# Patient Record
Sex: MALE | Race: WHITE | Employment: OTHER | URBAN - METROPOLITAN AREA
[De-identification: names, ages, dates, MRNs, and addresses within clinical notes are randomized per-mention and may not be internally consistent; named-entity substitution may affect disease eponyms.]

---

## 2018-07-09 ENCOUNTER — APPOINTMENT (OUTPATIENT)
Dept: GENERAL RADIOLOGY | Age: 68
End: 2018-07-09
Attending: EMERGENCY MEDICINE
Payer: MEDICARE

## 2018-07-09 ENCOUNTER — HOSPITAL ENCOUNTER (EMERGENCY)
Age: 68
Discharge: HOME | End: 2018-07-09
Attending: EMERGENCY MEDICINE
Payer: MEDICARE

## 2018-07-09 VITALS
TEMPERATURE: 98.3 F | OXYGEN SATURATION: 93 % | SYSTOLIC BLOOD PRESSURE: 145 MMHG | HEART RATE: 70 BPM | BODY MASS INDEX: 29.67 KG/M2 | WEIGHT: 195.8 LBS | RESPIRATION RATE: 15 BRPM | DIASTOLIC BLOOD PRESSURE: 75 MMHG | HEIGHT: 68 IN

## 2018-07-09 DIAGNOSIS — R07.89 ATYPICAL CHEST PAIN: Primary | ICD-10-CM

## 2018-07-09 LAB
ALBUMIN SERPL-MCNC: 3.6 G/DL (ref 3.5–5)
ALBUMIN/GLOB SERPL: 1.1 {RATIO} (ref 1.1–2.2)
ALP SERPL-CCNC: 38 U/L (ref 45–117)
ALT SERPL-CCNC: 27 U/L (ref 12–78)
ANION GAP SERPL CALC-SCNC: 8 MMOL/L (ref 5–15)
AST SERPL-CCNC: 17 U/L (ref 15–37)
ATTENDING PHYSICIAN, CST07: NORMAL
BASOPHILS # BLD: 0 K/UL (ref 0–0.1)
BASOPHILS NFR BLD: 1 % (ref 0–1)
BILIRUB SERPL-MCNC: 0.5 MG/DL (ref 0.2–1)
BUN SERPL-MCNC: 14 MG/DL (ref 6–20)
BUN/CREAT SERPL: 18 (ref 12–20)
CALCIUM SERPL-MCNC: 8.4 MG/DL (ref 8.5–10.1)
CHLORIDE SERPL-SCNC: 106 MMOL/L (ref 97–108)
CO2 SERPL-SCNC: 28 MMOL/L (ref 21–32)
CREAT SERPL-MCNC: 0.78 MG/DL (ref 0.7–1.3)
D DIMER PPP FEU-MCNC: 0.23 MG/L FEU (ref 0–0.65)
DIAGNOSIS, 93000: NORMAL
DIFFERENTIAL METHOD BLD: NORMAL
DUKE TM SCORE RESULT, CST14: NORMAL
DUKE TREADMILL SCORE, CST13: NORMAL
ECG INTERP BEFORE EX, CST11: NORMAL
ECG INTERP DURING EX, CST12: NORMAL
EOSINOPHIL # BLD: 0.1 K/UL (ref 0–0.4)
EOSINOPHIL NFR BLD: 1 % (ref 0–7)
ERYTHROCYTE [DISTWIDTH] IN BLOOD BY AUTOMATED COUNT: 13.2 % (ref 11.5–14.5)
FUNCTIONAL CAPACITY, CST17: NORMAL
GLOBULIN SER CALC-MCNC: 3.2 G/DL (ref 2–4)
GLUCOSE SERPL-MCNC: 99 MG/DL (ref 65–100)
HCT VFR BLD AUTO: 44.6 % (ref 36.6–50.3)
HGB BLD-MCNC: 14.4 G/DL (ref 12.1–17)
IMM GRANULOCYTES # BLD: 0 K/UL (ref 0–0.04)
IMM GRANULOCYTES NFR BLD AUTO: 0 % (ref 0–0.5)
KNOWN CARDIAC CONDITION, CST08: NORMAL
LYMPHOCYTES # BLD: 1.9 K/UL (ref 0.8–3.5)
LYMPHOCYTES NFR BLD: 24 % (ref 12–49)
MAX. DIASTOLIC BP, CST04: 71 MMHG
MAX. HEART RATE, CST05: 144 BPM
MAX. SYSTOLIC BP, CST03: 173 MMHG
MCH RBC QN AUTO: 29.4 PG (ref 26–34)
MCHC RBC AUTO-ENTMCNC: 32.3 G/DL (ref 30–36.5)
MCV RBC AUTO: 91.2 FL (ref 80–99)
MONOCYTES # BLD: 0.6 K/UL (ref 0–1)
MONOCYTES NFR BLD: 8 % (ref 5–13)
NEUTS SEG # BLD: 5.3 K/UL (ref 1.8–8)
NEUTS SEG NFR BLD: 66 % (ref 32–75)
NRBC # BLD: 0 K/UL (ref 0–0.01)
NRBC BLD-RTO: 0 PER 100 WBC
OVERALL BP RESPONSE TO EXERCISE, CST16: NORMAL
OVERALL HR RESPONSE TO EXERCISE, CST15: NORMAL
PEAK EX METS, CST10: 7 METS
PLATELET # BLD AUTO: 236 K/UL (ref 150–400)
PMV BLD AUTO: 10.7 FL (ref 8.9–12.9)
POTASSIUM SERPL-SCNC: 4.1 MMOL/L (ref 3.5–5.1)
PROT SERPL-MCNC: 6.8 G/DL (ref 6.4–8.2)
PROTOCOL NAME, CST01: NORMAL
RBC # BLD AUTO: 4.89 M/UL (ref 4.1–5.7)
REASON FOR TERMINATION: 67 BPM
SODIUM SERPL-SCNC: 142 MMOL/L (ref 136–145)
TEST INDICATION, CST09: NORMAL
TIME IN EXERCISE PHASE, CST02: NORMAL
TROPONIN I SERPL-MCNC: <0.05 NG/ML
WBC # BLD AUTO: 8 K/UL (ref 4.1–11.1)

## 2018-07-09 PROCEDURE — 96374 THER/PROPH/DIAG INJ IV PUSH: CPT

## 2018-07-09 PROCEDURE — 84484 ASSAY OF TROPONIN QUANT: CPT | Performed by: EMERGENCY MEDICINE

## 2018-07-09 PROCEDURE — 99285 EMERGENCY DEPT VISIT HI MDM: CPT

## 2018-07-09 PROCEDURE — 85025 COMPLETE CBC W/AUTO DIFF WBC: CPT | Performed by: EMERGENCY MEDICINE

## 2018-07-09 PROCEDURE — 93351 STRESS TTE COMPLETE: CPT

## 2018-07-09 PROCEDURE — 96361 HYDRATE IV INFUSION ADD-ON: CPT

## 2018-07-09 PROCEDURE — 85379 FIBRIN DEGRADATION QUANT: CPT | Performed by: EMERGENCY MEDICINE

## 2018-07-09 PROCEDURE — 74011250636 HC RX REV CODE- 250/636: Performed by: EMERGENCY MEDICINE

## 2018-07-09 PROCEDURE — 36415 COLL VENOUS BLD VENIPUNCTURE: CPT | Performed by: EMERGENCY MEDICINE

## 2018-07-09 PROCEDURE — 93005 ELECTROCARDIOGRAM TRACING: CPT

## 2018-07-09 PROCEDURE — 71045 X-RAY EXAM CHEST 1 VIEW: CPT

## 2018-07-09 PROCEDURE — 80053 COMPREHEN METABOLIC PANEL: CPT | Performed by: EMERGENCY MEDICINE

## 2018-07-09 PROCEDURE — 74011250637 HC RX REV CODE- 250/637: Performed by: EMERGENCY MEDICINE

## 2018-07-09 RX ORDER — GUAIFENESIN 100 MG/5ML
81 LIQUID (ML) ORAL DAILY
COMMUNITY

## 2018-07-09 RX ORDER — GUAIFENESIN 100 MG/5ML
324 LIQUID (ML) ORAL
Status: COMPLETED | OUTPATIENT
Start: 2018-07-09 | End: 2018-07-09

## 2018-07-09 RX ORDER — AMLODIPINE BESYLATE 10 MG/1
10 TABLET ORAL DAILY
COMMUNITY

## 2018-07-09 RX ORDER — LOVASTATIN 40 MG/1
40 TABLET ORAL
COMMUNITY

## 2018-07-09 RX ORDER — NITROGLYCERIN 0.4 MG/1
0.4 TABLET SUBLINGUAL
Status: DISCONTINUED | OUTPATIENT
Start: 2018-07-09 | End: 2018-07-09 | Stop reason: HOSPADM

## 2018-07-09 RX ORDER — ONDANSETRON 2 MG/ML
8 INJECTION INTRAMUSCULAR; INTRAVENOUS
Status: COMPLETED | OUTPATIENT
Start: 2018-07-09 | End: 2018-07-09

## 2018-07-09 RX ORDER — ACETAMINOPHEN 325 MG/1
650 TABLET ORAL
Status: COMPLETED | OUTPATIENT
Start: 2018-07-09 | End: 2018-07-09

## 2018-07-09 RX ADMIN — SODIUM CHLORIDE 1000 ML: 900 INJECTION, SOLUTION INTRAVENOUS at 06:35

## 2018-07-09 RX ADMIN — ONDANSETRON 8 MG: 2 INJECTION INTRAMUSCULAR; INTRAVENOUS at 06:47

## 2018-07-09 RX ADMIN — NITROGLYCERIN 0.4 MG: 0.4 TABLET SUBLINGUAL at 06:07

## 2018-07-09 RX ADMIN — ASPIRIN 81 MG 324 MG: 81 TABLET ORAL at 06:07

## 2018-07-09 RX ADMIN — ACETAMINOPHEN 650 MG: 325 TABLET ORAL at 06:48

## 2018-07-09 NOTE — ED NOTES
Bedside and Verbal shift change report given to Tuan Holt (oncoming nurse) by Carol Delgado RN (offgoing nurse). Report included the following information SBAR, ED Summary, MAR and Recent Results.

## 2018-07-09 NOTE — ED PROVIDER NOTES
Patient is a 75 y.o. male presenting with chest pain. Chest Pain (Angina)    The problem has been gradually worsening. Associated symptoms include dizziness and nausea. Pertinent negatives include no abdominal pain, no cough, no fever, no numbness, no shortness of breath, no vomiting and no weakness. 75year old male with history of htn, chol, anxiety, prostate cancer, presents with intermittent chest pain since Saturday night. Woke him from sleep on Saturday night- mid sternal discomfort with associated nausea and dizziness, not shortness of breath or diaphoresis. Has started working out again this past week and hadn't had any pain with exertion. Pain went away in the morning and he felt way by evening. Then again last night woke him around midnight. Pain does go up a little bit into his neck. He has HTN, Chol , and a family history (grandfather) with heart disease. No DM. Quit smoking in 1980. No leg pain or swelling. Did drive from [place] in early June. Lives in [place] most of the year and comes to [place] for the summer because their children/grandchildren live here. Currently 6/10 pain. Past Medical History:   Diagnosis Date    Anxiety     Hypercholesteremia     Hypertension     Prostate CA ([facility] [address]        No past surgical history on file. No family history on file. Social History     Social History    Marital status:      Spouse name: N/A    Number of children: N/A    Years of education: N/A     Occupational History    Not on file. Social History Main Topics    Smoking status: Not on file    Smokeless tobacco: Not on file    Alcohol use Not on file    Drug use: Not on file    Sexual activity: Not on file     Other Topics Concern    Not on file     Social History Narrative    No narrative on file         ALLERGIES: Review of patient's allergies indicates no known allergies. Review of Systems   Constitutional: Negative for fever.    HENT: Negative for congestion. Eyes: Negative for visual disturbance. Respiratory: Positive for chest tightness. Negative for cough and shortness of breath. Cardiovascular: Positive for chest pain. Negative for leg swelling. Gastrointestinal: Positive for nausea. Negative for abdominal pain and vomiting. Endocrine: Negative for polyuria. Genitourinary: Negative for dysuria. Musculoskeletal: Negative for gait problem. Skin: Negative for rash. Neurological: Positive for dizziness. Negative for weakness and numbness. Psychiatric/Behavioral: Negative for dysphoric mood. Vitals:    07/09/18 0529 07/09/18 0531   BP:  158/88   Pulse:  60   Resp:  14   Temp:  97.9 °F (36.6 °C)   SpO2:  96%   Weight: 88.8 kg (195 lb 12.8 oz)    Height:  5' 8\" (1.727 m)            Physical Exam   Constitutional: He is oriented to person, place, and time. He appears well-developed and well-nourished. No distress. HENT:   Head: Normocephalic and atraumatic. Mouth/Throat: Oropharynx is clear and moist. No oropharyngeal exudate. Eyes: Conjunctivae and EOM are normal. Pupils are equal, round, and reactive to light. Right eye exhibits no discharge. Left eye exhibits no discharge. No scleral icterus. Neck: Normal range of motion. Neck supple. No JVD present. Cardiovascular: Normal rate, regular rhythm, normal heart sounds and intact distal pulses. Exam reveals no gallop and no friction rub. No murmur heard. Pulmonary/Chest: Effort normal and breath sounds normal. No stridor. No respiratory distress. He has no wheezes. He has no rales. He exhibits no tenderness. Abdominal: Soft. Bowel sounds are normal. He exhibits no distension and no mass. There is no tenderness. There is no rebound and no guarding. Musculoskeletal: Normal range of motion. He exhibits no edema or tenderness. Neurological: He is alert and oriented to person, place, and time. He has normal reflexes. No cranial nerve deficit.  He exhibits normal muscle tone. Coordination normal.   Skin: Skin is warm and dry. No rash noted. No erythema. Psychiatric: He has a normal mood and affect. His behavior is normal. Judgment and thought content normal.        MDM      ED Course       Procedures         ED EKG interpretation:  Rhythm: normal sinus rhythm; and regular . Rate (approx.): 62; Axis: left axis deviation; P wave: normal; QRS interval: normal ; ST/T wave: non-specific changes; . This EKG was interpreted by Hazel Hron MD,ED Provider. Patient dropped his blood pressure and pulse with the Nitro- fluid bolus ordered. CXR clear. Initial troponin neg. CBC/ddimer still pending. Work up reassuring. No change on serial EKGs. Neg tropon with >5 hours of pain. Pain resolve with zofran/tylenol. ? Abdirizak Garcia? GIven risk factors will order stress echo. If neg, d/c with PPI's and follow up with gi/pcp.

## 2018-07-09 NOTE — DISCHARGE INSTRUCTIONS
Chest Pain: Care Instructions  Your Care Instructions    There are many things that can cause chest pain. Some are not serious and will get better on their own in a few days. But some kinds of chest pain need more testing and treatment. Your doctor may have recommended a follow-up visit in the next 8 to 12 hours. If you are not getting better, you may need more tests or treatment. Even though your doctor has released you, you still need to watch for any problems. The doctor carefully checked you, but sometimes problems can develop later. If you have new symptoms or if your symptoms do not get better, get medical care right away. If you have worse or different chest pain or pressure that lasts more than 5 minutes or you passed out (lost consciousness), call 911 or seek other emergency help right away. A medical visit is only one step in your treatment. Even if you feel better, you still need to do what your doctor recommends, such as going to all suggested follow-up appointments and taking medicines exactly as directed. This will help you recover and help prevent future problems. How can you care for yourself at home? · Rest until you feel better. · Take your medicine exactly as prescribed. Call your doctor if you think you are having a problem with your medicine. · Do not drive after taking a prescription pain medicine. When should you call for help? Call 911 if:  ? · You passed out (lost consciousness). ? · You have severe difficulty breathing. ? · You have symptoms of a heart attack. These may include:  ¨ Chest pain or pressure, or a strange feeling in your chest.  ¨ Sweating. ¨ Shortness of breath. ¨ Nausea or vomiting. ¨ Pain, pressure, or a strange feeling in your back, neck, jaw, or upper belly or in one or both shoulders or arms. ¨ Lightheadedness or sudden weakness. ¨ A fast or irregular heartbeat.   After you call 911, the  may tell you to chew 1 adult-strength or 2 to 4 low-dose aspirin. Wait for an ambulance. Do not try to drive yourself. ?Call your doctor today if:  ? · You have any trouble breathing. ? · Your chest pain gets worse. ? · You are dizzy or lightheaded, or you feel like you may faint. ? · You are not getting better as expected. ? · You are having new or different chest pain. Where can you learn more? Go to http://abigail-ruthie.info/. Enter A120 in the search box to learn more about \"Chest Pain: Care Instructions. \"  Current as of: March 20, 2017  Content Version: 11.4  © 1001-5156 [employer]. Care instructions adapted under license by [employer] (which disclaims liability or warranty for this information). If you have questions about a medical condition or this instruction, always ask your healthcare professional. [address] any warranty or liability for your use of this information.

## 2018-07-09 NOTE — ED TRIAGE NOTES
Triage note: Pt states he started to have dizziness and midsternal chest discomfort that is intermittent since yesterday morning. Denies N/V/dyspnea/numbness/tingling.

## 2018-07-09 NOTE — ED NOTES
7:50 AMChange of shift. Care of patient taken over from Dr Batsheva Neves; H&P reviewed. Awaiting labs/imaging/consultant. Handoff complete.

## 2018-07-09 NOTE — ED NOTES
Chest X-ray: Impression: Normal chest.    9:18 AM  Lab work, chest x-ray unremarkable. Negative troponin, negative D-Dimer. Discussed available lab and imaging results with patient. He verbalizes understanding and agrees with care plan. Will discharge patient home with specific return precautions; no prescriptions; advised f/u with cardiology, PCP, [facility] ED as needed. Patient's results have been reviewed with them. Patient and/or family have verbally conveyed their understanding and agreement of the patient's signs, symptoms, diagnosis, treatment and prognosis and additionally agree to follow up as recommended or return to the Emergency Room should their condition change prior to follow-up. Discharge instructions have also been provided to the patient with some educational information regarding their diagnosis as well a list of reasons why they would want to return to the ER prior to their follow-up appointment should their condition change.

## 2018-07-10 LAB
ATRIAL RATE: 62 BPM
ATRIAL RATE: 65 BPM
CALCULATED P AXIS, ECG09: 48 DEGREES
CALCULATED P AXIS, ECG09: 61 DEGREES
CALCULATED R AXIS, ECG10: -22 DEGREES
CALCULATED R AXIS, ECG10: -23 DEGREES
CALCULATED T AXIS, ECG11: -5 DEGREES
CALCULATED T AXIS, ECG11: 18 DEGREES
DIAGNOSIS, 93000: NORMAL
DIAGNOSIS, 93000: NORMAL
P-R INTERVAL, ECG05: 162 MS
P-R INTERVAL, ECG05: 170 MS
Q-T INTERVAL, ECG07: 402 MS
Q-T INTERVAL, ECG07: 422 MS
QRS DURATION, ECG06: 92 MS
QRS DURATION, ECG06: 94 MS
QTC CALCULATION (BEZET), ECG08: 408 MS
QTC CALCULATION (BEZET), ECG08: 438 MS
VENTRICULAR RATE, ECG03: 62 BPM
VENTRICULAR RATE, ECG03: 65 BPM

## 2022-06-04 ENCOUNTER — TELEPHONE ENCOUNTER (OUTPATIENT)
Dept: URBAN - METROPOLITAN AREA CLINIC 68 | Facility: CLINIC | Age: 72
End: 2022-06-04

## 2022-06-05 ENCOUNTER — TELEPHONE ENCOUNTER (OUTPATIENT)
Dept: URBAN - METROPOLITAN AREA CLINIC 68 | Facility: CLINIC | Age: 72
End: 2022-06-05

## 2022-06-05 RX ORDER — LOVASTATIN 40 MG/1
LOVASTATIN( 40MG ORAL  DAILY ) ACTIVE -HX ENTRY TABLET ORAL DAILY
Status: ACTIVE | COMMUNITY
Start: 2013-11-21

## 2022-06-05 RX ORDER — SILODOSIN 8 MG/1
RAPAFLO( 8MG ORAL  PRN ) ACTIVE -HX ENTRY CAPSULE ORAL PRN
Status: ACTIVE | COMMUNITY
Start: 2013-11-21

## 2022-06-05 RX ORDER — AMLODIPINE VALSARTAN AND HYDROCHLOROTHIAZIDE 10; 320; 25 MG/1; MG/1; MG/1
EXFORGE HCT( 10-320-25MG ORAL  DAILY ) ACTIVE -HX ENTRY TABLET, FILM COATED ORAL DAILY
Status: ACTIVE | COMMUNITY
Start: 2013-11-21

## 2022-06-25 ENCOUNTER — TELEPHONE ENCOUNTER (OUTPATIENT)
Age: 72
End: 2022-06-25

## 2022-06-25 RX ORDER — SODIUM SULFATE, POTASSIUM SULFATE, MAGNESIUM SULFATE 17.5; 3.13; 1.6 G/ML; G/ML; G/ML
SOLUTION, CONCENTRATE ORAL AS DIRECTED
Qty: 1 | Refills: 0 | OUTPATIENT
Start: 2013-11-21 | End: 2013-11-22

## 2022-06-26 ENCOUNTER — TELEPHONE ENCOUNTER (OUTPATIENT)
Age: 72
End: 2022-06-26

## 2022-06-26 RX ORDER — SILODOSIN 8 MG/1
RAPAFLO( 8MG ORAL  PRN ) ACTIVE -HX ENTRY CAPSULE ORAL PRN
Status: ACTIVE | COMMUNITY
Start: 2013-11-21

## 2022-06-26 RX ORDER — LOVASTATIN 40 MG/1
LOVASTATIN( 40MG ORAL  DAILY ) ACTIVE -HX ENTRY TABLET ORAL DAILY
Status: ACTIVE | COMMUNITY
Start: 2013-11-21

## 2022-06-26 RX ORDER — AMLODIPINE VALSARTAN AND HYDROCHLOROTHIAZIDE 10; 320; 25 MG/1; MG/1; MG/1
EXFORGE HCT( 10-320-25MG ORAL  DAILY ) ACTIVE -HX ENTRY TABLET, FILM COATED ORAL DAILY
Status: ACTIVE | COMMUNITY
Start: 2013-11-21

## 2023-12-18 ENCOUNTER — OFFICE VISIT (OUTPATIENT)
Dept: URBAN - METROPOLITAN AREA CLINIC 68 | Facility: CLINIC | Age: 73
End: 2023-12-18

## 2025-05-11 ENCOUNTER — APPOINTMENT (OUTPATIENT)
Dept: CT IMAGING | Age: 75
End: 2025-05-11
Payer: MEDICARE

## 2025-05-11 ENCOUNTER — HOSPITAL ENCOUNTER (OUTPATIENT)
Age: 75
Setting detail: OBSERVATION
Discharge: HOME | End: 2025-05-12
Attending: EMERGENCY MEDICINE | Admitting: STUDENT IN AN ORGANIZED HEALTH CARE EDUCATION/TRAINING PROGRAM
Payer: MEDICARE

## 2025-05-11 DIAGNOSIS — G45.9 TIA (TRANSIENT ISCHEMIC ATTACK): Primary | ICD-10-CM

## 2025-05-11 DIAGNOSIS — I63.9 CEREBROVASCULAR ACCIDENT (CVA), UNSPECIFIED MECHANISM (HCC): ICD-10-CM

## 2025-05-11 PROBLEM — I10 HYPERTENSION: Status: ACTIVE | Noted: 2025-05-11

## 2025-05-11 PROBLEM — R73.9 HYPERGLYCEMIA: Status: ACTIVE | Noted: 2025-05-11

## 2025-05-11 PROBLEM — E78.5 HYPERLIPIDEMIA: Status: ACTIVE | Noted: 2025-05-11

## 2025-05-11 PROBLEM — M54.50 CHRONIC LOW BACK PAIN: Status: ACTIVE | Noted: 2025-05-11

## 2025-05-11 PROBLEM — G89.29 CHRONIC LOW BACK PAIN: Status: ACTIVE | Noted: 2025-05-11

## 2025-05-11 PROBLEM — Z96.652 HISTORY OF LEFT KNEE REPLACEMENT: Status: ACTIVE | Noted: 2025-05-11

## 2025-05-11 LAB
ALBUMIN SERPL-MCNC: 3.6 G/DL (ref 3.2–4.6)
ALBUMIN/GLOB SERPL: 1.2 (ref 1–1.9)
ALP SERPL-CCNC: 45 U/L (ref 40–129)
ALT SERPL-CCNC: 42 U/L (ref 8–55)
ANION GAP SERPL CALC-SCNC: 10 MMOL/L (ref 7–16)
AST SERPL-CCNC: 46 U/L (ref 15–37)
BASOPHILS # BLD: 0.03 K/UL (ref 0–0.2)
BASOPHILS NFR BLD: 0.4 % (ref 0–2)
BILIRUB SERPL-MCNC: 0.2 MG/DL (ref 0–1.2)
BUN SERPL-MCNC: 18 MG/DL (ref 8–23)
CALCIUM SERPL-MCNC: 9.8 MG/DL (ref 8.8–10.2)
CHLORIDE SERPL-SCNC: 106 MMOL/L (ref 98–107)
CO2 SERPL-SCNC: 26 MMOL/L (ref 20–29)
CREAT SERPL-MCNC: 0.81 MG/DL (ref 0.8–1.3)
DIFFERENTIAL METHOD BLD: ABNORMAL
EOSINOPHIL # BLD: 0.09 K/UL (ref 0–0.8)
EOSINOPHIL NFR BLD: 1.1 % (ref 0.5–7.8)
ERYTHROCYTE [DISTWIDTH] IN BLOOD BY AUTOMATED COUNT: 14.2 % (ref 11.9–14.6)
GLOBULIN SER CALC-MCNC: 3 G/DL (ref 2.3–3.5)
GLUCOSE SERPL-MCNC: 124 MG/DL (ref 70–99)
HCT VFR BLD AUTO: 41 % (ref 41.1–50.3)
HGB BLD-MCNC: 13.7 G/DL (ref 13.6–17.2)
IMM GRANULOCYTES # BLD AUTO: 0.01 K/UL (ref 0–0.5)
IMM GRANULOCYTES NFR BLD AUTO: 0.1 % (ref 0–5)
INR PPP: 0.9
LYMPHOCYTES # BLD: 2.77 K/UL (ref 0.5–4.6)
LYMPHOCYTES NFR BLD: 33.7 % (ref 13–44)
MAGNESIUM SERPL-MCNC: 2.2 MG/DL (ref 1.8–2.4)
MCH RBC QN AUTO: 29.7 PG (ref 26.1–32.9)
MCHC RBC AUTO-ENTMCNC: 33.4 G/DL (ref 31.4–35)
MCV RBC AUTO: 88.7 FL (ref 82–102)
MONOCYTES # BLD: 0.6 K/UL (ref 0.1–1.3)
MONOCYTES NFR BLD: 7.3 % (ref 4–12)
NEUTS SEG # BLD: 4.72 K/UL (ref 1.7–8.2)
NEUTS SEG NFR BLD: 57.4 % (ref 43–78)
NRBC # BLD: 0 K/UL (ref 0–0.2)
PLATELET # BLD AUTO: 235 K/UL (ref 150–450)
PMV BLD AUTO: 10.5 FL (ref 9.4–12.3)
POTASSIUM SERPL-SCNC: ABNORMAL MMOL/L (ref 3.5–5.1)
PROT SERPL-MCNC: 6.6 G/DL (ref 6.3–8.2)
PROTHROMBIN TIME: 12.5 SEC (ref 11.3–14.9)
RBC # BLD AUTO: 4.62 M/UL (ref 4.23–5.6)
SODIUM SERPL-SCNC: 142 MMOL/L (ref 136–145)
WBC # BLD AUTO: 8.2 K/UL (ref 4.3–11.1)

## 2025-05-11 PROCEDURE — 70450 CT HEAD/BRAIN W/O DYE: CPT

## 2025-05-11 PROCEDURE — 85610 PROTHROMBIN TIME: CPT

## 2025-05-11 PROCEDURE — 83735 ASSAY OF MAGNESIUM: CPT

## 2025-05-11 PROCEDURE — 2500000003 HC RX 250 WO HCPCS: Performed by: STUDENT IN AN ORGANIZED HEALTH CARE EDUCATION/TRAINING PROGRAM

## 2025-05-11 PROCEDURE — 6360000004 HC RX CONTRAST MEDICATION: Performed by: EMERGENCY MEDICINE

## 2025-05-11 PROCEDURE — G0378 HOSPITAL OBSERVATION PER HR: HCPCS

## 2025-05-11 PROCEDURE — 6370000000 HC RX 637 (ALT 250 FOR IP): Performed by: STUDENT IN AN ORGANIZED HEALTH CARE EDUCATION/TRAINING PROGRAM

## 2025-05-11 PROCEDURE — 70498 CT ANGIOGRAPHY NECK: CPT

## 2025-05-11 PROCEDURE — 99285 EMERGENCY DEPT VISIT HI MDM: CPT

## 2025-05-11 PROCEDURE — 80053 COMPREHEN METABOLIC PANEL: CPT

## 2025-05-11 PROCEDURE — 93005 ELECTROCARDIOGRAM TRACING: CPT | Performed by: EMERGENCY MEDICINE

## 2025-05-11 PROCEDURE — 85025 COMPLETE CBC W/AUTO DIFF WBC: CPT

## 2025-05-11 RX ORDER — LABETALOL HYDROCHLORIDE 5 MG/ML
10 INJECTION, SOLUTION INTRAVENOUS
Status: DISCONTINUED | OUTPATIENT
Start: 2025-05-11 | End: 2025-05-12 | Stop reason: HOSPADM

## 2025-05-11 RX ORDER — VALSARTAN 80 MG/1
40 TABLET ORAL DAILY
Status: DISCONTINUED | OUTPATIENT
Start: 2025-05-11 | End: 2025-05-12 | Stop reason: HOSPADM

## 2025-05-11 RX ORDER — ASPIRIN 300 MG/1
300 SUPPOSITORY RECTAL ONCE
Status: COMPLETED | OUTPATIENT
Start: 2025-05-11 | End: 2025-05-11

## 2025-05-11 RX ORDER — ROSUVASTATIN CALCIUM 20 MG/1
20 TABLET, COATED ORAL DAILY
COMMUNITY

## 2025-05-11 RX ORDER — ENOXAPARIN SODIUM 100 MG/ML
40 INJECTION SUBCUTANEOUS DAILY
Status: DISCONTINUED | OUTPATIENT
Start: 2025-05-12 | End: 2025-05-12 | Stop reason: HOSPADM

## 2025-05-11 RX ORDER — ONDANSETRON 2 MG/ML
4 INJECTION INTRAMUSCULAR; INTRAVENOUS EVERY 6 HOURS PRN
Status: DISCONTINUED | OUTPATIENT
Start: 2025-05-11 | End: 2025-05-12 | Stop reason: HOSPADM

## 2025-05-11 RX ORDER — SODIUM CHLORIDE 0.9 % (FLUSH) 0.9 %
5-40 SYRINGE (ML) INJECTION PRN
Status: DISCONTINUED | OUTPATIENT
Start: 2025-05-11 | End: 2025-05-12 | Stop reason: HOSPADM

## 2025-05-11 RX ORDER — HYDROCHLOROTHIAZIDE 12.5 MG/1
12.5 CAPSULE ORAL DAILY
COMMUNITY

## 2025-05-11 RX ORDER — ASPIRIN 81 MG/1
81 TABLET ORAL DAILY
COMMUNITY

## 2025-05-11 RX ORDER — ONDANSETRON 4 MG/1
4 TABLET, ORALLY DISINTEGRATING ORAL EVERY 8 HOURS PRN
Status: DISCONTINUED | OUTPATIENT
Start: 2025-05-11 | End: 2025-05-12 | Stop reason: HOSPADM

## 2025-05-11 RX ORDER — IOPAMIDOL 755 MG/ML
100 INJECTION, SOLUTION INTRAVASCULAR
Status: COMPLETED | OUTPATIENT
Start: 2025-05-11 | End: 2025-05-11

## 2025-05-11 RX ORDER — SODIUM CHLORIDE 9 MG/ML
INJECTION, SOLUTION INTRAVENOUS PRN
Status: DISCONTINUED | OUTPATIENT
Start: 2025-05-11 | End: 2025-05-12 | Stop reason: HOSPADM

## 2025-05-11 RX ORDER — POLYETHYLENE GLYCOL 3350 17 G/17G
17 POWDER, FOR SOLUTION ORAL DAILY PRN
Status: DISCONTINUED | OUTPATIENT
Start: 2025-05-11 | End: 2025-05-12 | Stop reason: HOSPADM

## 2025-05-11 RX ORDER — ROSUVASTATIN CALCIUM 10 MG/1
40 TABLET, COATED ORAL NIGHTLY
Status: DISCONTINUED | OUTPATIENT
Start: 2025-05-11 | End: 2025-05-12 | Stop reason: HOSPADM

## 2025-05-11 RX ORDER — ASPIRIN 325 MG
325 TABLET ORAL ONCE
Status: COMPLETED | OUTPATIENT
Start: 2025-05-11 | End: 2025-05-11

## 2025-05-11 RX ORDER — SODIUM CHLORIDE 0.9 % (FLUSH) 0.9 %
5-40 SYRINGE (ML) INJECTION EVERY 12 HOURS SCHEDULED
Status: DISCONTINUED | OUTPATIENT
Start: 2025-05-11 | End: 2025-05-12 | Stop reason: HOSPADM

## 2025-05-11 RX ORDER — AMLODIPINE BESYLATE 10 MG/1
10 TABLET ORAL DAILY
Status: DISCONTINUED | OUTPATIENT
Start: 2025-05-11 | End: 2025-05-12 | Stop reason: HOSPADM

## 2025-05-11 RX ORDER — ASPIRIN 81 MG/1
81 TABLET, CHEWABLE ORAL DAILY
Status: DISCONTINUED | OUTPATIENT
Start: 2025-05-12 | End: 2025-05-12 | Stop reason: HOSPADM

## 2025-05-11 RX ORDER — SEMAGLUTIDE 1.34 MG/ML
1 INJECTION, SOLUTION SUBCUTANEOUS
COMMUNITY

## 2025-05-11 RX ADMIN — VALSARTAN 40 MG: 40 TABLET, FILM COATED ORAL at 21:24

## 2025-05-11 RX ADMIN — ASPIRIN 325 MG: 325 TABLET, FILM COATED ORAL at 21:30

## 2025-05-11 RX ADMIN — SODIUM CHLORIDE, PRESERVATIVE FREE 10 ML: 5 INJECTION INTRAVENOUS at 21:53

## 2025-05-11 RX ADMIN — AMLODIPINE BESYLATE 10 MG: 10 TABLET ORAL at 21:23

## 2025-05-11 RX ADMIN — IOPAMIDOL 100 ML: 755 INJECTION, SOLUTION INTRAVENOUS at 18:20

## 2025-05-11 RX ADMIN — ROSUVASTATIN CALCIUM 40 MG: 10 TABLET, FILM COATED ORAL at 21:24

## 2025-05-11 ASSESSMENT — LIFESTYLE VARIABLES
HOW MANY STANDARD DRINKS CONTAINING ALCOHOL DO YOU HAVE ON A TYPICAL DAY: 1 OR 2
HOW OFTEN DO YOU HAVE A DRINK CONTAINING ALCOHOL: 4 OR MORE TIMES A WEEK

## 2025-05-11 ASSESSMENT — PAIN - FUNCTIONAL ASSESSMENT: PAIN_FUNCTIONAL_ASSESSMENT: NONE - DENIES PAIN

## 2025-05-11 NOTE — ED TRIAGE NOTES
Pt brought in by EMS from [facility] c/o sudden onset aphasia and unsteady gait that occurred at approx 1615 and lasted for approx 5 minutes. Wife states he did not have any facial droop, unilateral weakness, or numbness. Symptoms improved prior to EMS arrival. Pt reports that at around 1400 he began to feel somewhat \"fuzzy headed\" and felt like his \"brain was foggy\" but denies any other symptoms at that time. Denies previous hx of TIA or CVAs. Pt reports hx of brain fog d/t long covid.

## 2025-05-11 NOTE — H&P
Hospitalist History and Physical   Admit Date:  2025  5:10 PM   Name:  Choco Sarmiento   Age:  74 y.o.  Sex:  male  :  1950   MRN:  [id number]   Room:  [place] [address]    Presenting/Chief Complaint: Possible TIA     Reason(s) for Admission: TIA (transient ischemic attack) [G45.9]     History of Present Illness:   Choco Sarmiento is a 74 y.o. male with medical history of hypertension and hyperlipidemia who presented with an episode of expressive aphasia earlier today that lasted for about 5 minutes.  Patient is from [place] and in town visiting and was walking around a local festival when he was trying to talk with his wife and noticed he had difficulty speaking.  Patient's wife and patient both state he did not have any focal weakness in his arms or legs, no facial droop.  Patient denies chest pain, shortness of breath, palpitations, nausea, abdominal pain, vision changes associated.  Of note patient has been had a dull frontal headache over the past 2 days which is unusual for him.  He admits to not taking his home medications over the past 2 days due to traveling.  He states his blood pressure has been running in the 140s and 150s systolic even on his home regimen of amlodipine, valsartan, hydrochlorothiazide.    On arrival to the ED CMP and CBC largely unremarkable.  Head CT shows no acute intercranial abnormality.  CTA head and neck shows no acute abnormality.  EKG reviewed and shows sinus rhythm.  Brain MRI and echocardiogram ordered.  High-dose statin and full-strength aspirin given.    Patient evaluated at bedside.  Resting comfortably in bed with wife at bedside.  Patient states symptoms have completely resolved.  Systolic blood pressure around 200.  Neuroexam benign with no focal neurologic deficits noted, no pronator drift, no facial droop, no slurred speech or aphasia noted.      Assessment & Plan:     TIA:  - CT head: No acute abnormality  - CTA head and neck: No acute abnormality  - Brain

## 2025-05-11 NOTE — ED PROVIDER NOTES
Emergency Department Provider Note       [facility] EMERGENCY DEPT   PCP: Angelo Patricia RN   Age: 74 y.o.   Sex: male     DISPOSITION Decision To Discharge 05/11/2025 07:27:13 PM    ICD-10-CM    1. TIA (transient ischemic attack)  G45.9           Medical Decision Making     Patient has signs of a TIA.   no signs of a stroke and with a NIH of 0 he would not be a tPA candidate.  Stroke alert was not called based on his current clinical exam.  I did order CT head and a CTA of his head and neck along with labs and EKG.  Colton Hernandez MD 5:35 PM EDT 5/11/25      Patient's CT head and CTA have come back normal discussed case with family and with his daughter who is a neurologist in [place].  I will allow permissive hypertension he is 176/96 currently and I discussed case with hospitalist for admission for the rest of the TIA workup.  Colton Hernandez MD 7:25 PM EDT 5/11/25       1 or more acute illnesses that pose a threat to life or bodily function.   Discussion with external consultants.  Shared medical decision making was utilized in creating the patients health plan today.  I independently ordered and reviewed each unique test.       The patients assessment required an independent historian: wife and daughter.  The reason they were needed is important historical information not provided by the patient.  ED cardiac monitoring rhythm strip was ordered and interpreted:  sinus rhythm, no evidence of an arrhythmia  ST Segments:Normal ST segments - NO STEMI   Rate: 75 no A-fib or PVCs  I interpreted the CT Scan no intracranial bleed.  ED provider's independent EKG interpretation sinus rhythm rate of 77 no ischemia  The patient was admitted and I have discussed patient management with the admitting provider.  The management of this patient was discussed with an external consultant.            History     HISTORY OF PRESENT ILLNESS:    A 74-year-old male with a past medical history significant for hypertension

## 2025-05-12 ENCOUNTER — APPOINTMENT (OUTPATIENT)
Dept: NON INVASIVE DIAGNOSTICS | Age: 75
End: 2025-05-12
Attending: STUDENT IN AN ORGANIZED HEALTH CARE EDUCATION/TRAINING PROGRAM
Payer: MEDICARE

## 2025-05-12 ENCOUNTER — APPOINTMENT (OUTPATIENT)
Dept: MRI IMAGING | Age: 75
End: 2025-05-12
Payer: MEDICARE

## 2025-05-12 VITALS
HEART RATE: 68 BPM | TEMPERATURE: 98.1 F | SYSTOLIC BLOOD PRESSURE: 154 MMHG | HEIGHT: 68 IN | DIASTOLIC BLOOD PRESSURE: 84 MMHG | BODY MASS INDEX: 28.79 KG/M2 | RESPIRATION RATE: 17 BRPM | WEIGHT: 190 LBS | OXYGEN SATURATION: 97 %

## 2025-05-12 LAB
ANION GAP SERPL CALC-SCNC: 10 MMOL/L (ref 7–16)
BUN SERPL-MCNC: 12 MG/DL (ref 8–23)
CALCIUM SERPL-MCNC: 9 MG/DL (ref 8.8–10.2)
CHLORIDE SERPL-SCNC: 106 MMOL/L (ref 98–107)
CHOLEST SERPL-MCNC: 169 MG/DL (ref 0–200)
CO2 SERPL-SCNC: 28 MMOL/L (ref 20–29)
CREAT SERPL-MCNC: 0.66 MG/DL (ref 0.8–1.3)
ECHO AO ASC DIAM: 3 CM
ECHO AO ASCENDING AORTA INDEX: 1.5 CM/M2
ECHO AO ROOT DIAM: 3.6 CM
ECHO AO ROOT INDEX: 1.8 CM/M2
ECHO AV AREA PEAK VELOCITY: 1.2 CM2
ECHO AV AREA VTI: 1.3 CM2
ECHO AV AREA/BSA PEAK VELOCITY: 0.6 CM2/M2
ECHO AV AREA/BSA VTI: 0.7 CM2/M2
ECHO AV MEAN GRADIENT: 7 MMHG
ECHO AV MEAN VELOCITY: 1.3 M/S
ECHO AV PEAK GRADIENT: 12 MMHG
ECHO AV PEAK GRADIENT: 12 MMHG
ECHO AV PEAK VELOCITY: 1.7 M/S
ECHO AV VELOCITY RATIO: 0.41
ECHO AV VTI: 36.3 CM
ECHO BSA: 2.03 M2
ECHO EST RA PRESSURE: 3 MMHG
ECHO IVC PROX: 1.3 CM
ECHO LA AREA 2C: 14.9 CM2
ECHO LA AREA 4C: 14.5 CM2
ECHO LA MAJOR AXIS: 4.6 CM
ECHO LA MINOR AXIS: 4.9 CM
ECHO LA VOL BP: 37 ML (ref 18–58)
ECHO LA VOL MOD A2C: 37 ML (ref 18–58)
ECHO LA VOL MOD A4C: 36 ML (ref 18–58)
ECHO LA VOL/BSA BIPLANE: 19 ML/M2 (ref 16–34)
ECHO LA VOLUME INDEX MOD A2C: 19 ML/M2 (ref 16–34)
ECHO LA VOLUME INDEX MOD A4C: 18 ML/M2 (ref 16–34)
ECHO LV E' LATERAL VELOCITY: 5.11 CM/S
ECHO LV E' SEPTAL VELOCITY: 5.77 CM/S
ECHO LV EDV A2C: 137 ML
ECHO LV EDV A4C: 148 ML
ECHO LV EDV INDEX A4C: 74 ML/M2
ECHO LV EDV NDEX A2C: 69 ML/M2
ECHO LV EF PHYSICIAN: 65 %
ECHO LV EJECTION FRACTION A2C: 68 %
ECHO LV EJECTION FRACTION A4C: 68 %
ECHO LV EJECTION FRACTION BIPLANE: 68 % (ref 55–100)
ECHO LV ESV A2C: 45 ML
ECHO LV ESV A4C: 48 ML
ECHO LV ESV INDEX A2C: 23 ML/M2
ECHO LV ESV INDEX A4C: 24 ML/M2
ECHO LV FRACTIONAL SHORTENING: 41 % (ref 28–44)
ECHO LV INTERNAL DIMENSION DIASTOLE INDEX: 2.3 CM/M2
ECHO LV INTERNAL DIMENSION DIASTOLIC: 4.6 CM (ref 4.2–5.9)
ECHO LV INTERNAL DIMENSION SYSTOLIC INDEX: 1.35 CM/M2
ECHO LV INTERNAL DIMENSION SYSTOLIC: 2.7 CM
ECHO LV IVSD: 1 CM (ref 0.6–1)
ECHO LV MASS 2D: 158.8 G (ref 88–224)
ECHO LV MASS INDEX 2D: 79.4 G/M2 (ref 49–115)
ECHO LV POSTERIOR WALL DIASTOLIC: 1 CM (ref 0.6–1)
ECHO LV RELATIVE WALL THICKNESS RATIO: 0.43
ECHO LVOT AREA: 3.1 CM2
ECHO LVOT AV VTI INDEX: 0.42
ECHO LVOT DIAM: 2 CM
ECHO LVOT MEAN GRADIENT: 1 MMHG
ECHO LVOT PEAK GRADIENT: 2 MMHG
ECHO LVOT PEAK VELOCITY: 0.7 M/S
ECHO LVOT STROKE VOLUME INDEX: 24 ML/M2
ECHO LVOT SV: 48 ML
ECHO LVOT VTI: 15.3 CM
ECHO MV A VELOCITY: 0.91 M/S
ECHO MV AREA VTI: 2.2 CM2
ECHO MV E DECELERATION TIME (DT): 199 MS
ECHO MV E VELOCITY: 0.57 M/S
ECHO MV E/A RATIO: 0.63
ECHO MV E/E' LATERAL: 11.15
ECHO MV E/E' RATIO (AVERAGED): 10.52
ECHO MV E/E' SEPTAL: 9.88
ECHO MV LVOT VTI INDEX: 1.46
ECHO MV MAX VELOCITY: 1 M/S
ECHO MV MEAN GRADIENT: 2 MMHG
ECHO MV MEAN VELOCITY: 0.6 M/S
ECHO MV PEAK GRADIENT: 4 MMHG
ECHO MV VTI: 22.3 CM
ECHO PULMONARY ARTERY END DIASTOLIC PRESSURE: 3 MMHG
ECHO PV ACCELERATION TIME (AT): 92 MS
ECHO PV MAX VELOCITY: 0.9 M/S
ECHO PV PEAK GRADIENT: 3 MMHG
ECHO PV REGURGITANT END DIASTOLIC MAX VELOCITY: 0.9 M/S
ECHO RIGHT VENTRICULAR SYSTOLIC PRESSURE (RVSP): 16 MMHG
ECHO RV BASAL DIMENSION: 3.3 CM
ECHO RV LONGITUDINAL DIMENSION: 6.6 CM
ECHO RV MID DIMENSION: 3.1 CM
ECHO RV TAPSE: 2.1 CM (ref 1.7–?)
ECHO TV REGURGITANT MAX VELOCITY: 1.8 M/S
ECHO TV REGURGITANT PEAK GRADIENT: 13 MMHG
EKG ATRIAL RATE: 76 BPM
EKG DIAGNOSIS: NORMAL
EKG P AXIS: 59 DEGREES
EKG P-R INTERVAL: 171 MS
EKG Q-T INTERVAL: 396 MS
EKG QRS DURATION: 99 MS
EKG QTC CALCULATION (BAZETT): 449 MS
EKG R AXIS: -50 DEGREES
EKG T AXIS: 19 DEGREES
EKG VENTRICULAR RATE: 77 BPM
ERYTHROCYTE [DISTWIDTH] IN BLOOD BY AUTOMATED COUNT: 14.2 % (ref 11.9–14.6)
EST. AVERAGE GLUCOSE BLD GHB EST-MCNC: 131 MG/DL
GLUCOSE BLD STRIP.AUTO-MCNC: 110 MG/DL (ref 65–100)
GLUCOSE BLD STRIP.AUTO-MCNC: 114 MG/DL (ref 65–100)
GLUCOSE BLD STRIP.AUTO-MCNC: 92 MG/DL (ref 65–100)
GLUCOSE SERPL-MCNC: 100 MG/DL (ref 70–99)
HBA1C MFR BLD: 6.2 % (ref 0–5.6)
HCT VFR BLD AUTO: 40.6 % (ref 41.1–50.3)
HDLC SERPL-MCNC: 72 MG/DL (ref 40–60)
HDLC SERPL: 2.3 (ref 0–5)
HGB BLD-MCNC: 13.6 G/DL (ref 13.6–17.2)
LDLC SERPL CALC-MCNC: 81 MG/DL (ref 0–100)
MCH RBC QN AUTO: 29.8 PG (ref 26.1–32.9)
MCHC RBC AUTO-ENTMCNC: 33.5 G/DL (ref 31.4–35)
MCV RBC AUTO: 88.8 FL (ref 82–102)
NRBC # BLD: 0 K/UL (ref 0–0.2)
PLATELET # BLD AUTO: 175 K/UL (ref 150–450)
PMV BLD AUTO: 9.9 FL (ref 9.4–12.3)
POTASSIUM SERPL-SCNC: 4 MMOL/L (ref 3.5–5.1)
RBC # BLD AUTO: 4.57 M/UL (ref 4.23–5.6)
SERVICE CMNT-IMP: ABNORMAL
SERVICE CMNT-IMP: ABNORMAL
SERVICE CMNT-IMP: NORMAL
SODIUM SERPL-SCNC: 144 MMOL/L (ref 136–145)
TRIGL SERPL-MCNC: 80 MG/DL (ref 0–150)
VLDLC SERPL CALC-MCNC: 16 MG/DL (ref 6–23)
WBC # BLD AUTO: 8.6 K/UL (ref 4.3–11.1)

## 2025-05-12 PROCEDURE — 82962 GLUCOSE BLOOD TEST: CPT

## 2025-05-12 PROCEDURE — 92610 EVALUATE SWALLOWING FUNCTION: CPT

## 2025-05-12 PROCEDURE — 83036 HEMOGLOBIN GLYCOSYLATED A1C: CPT

## 2025-05-12 PROCEDURE — 96372 THER/PROPH/DIAG INJ SC/IM: CPT

## 2025-05-12 PROCEDURE — G0378 HOSPITAL OBSERVATION PER HR: HCPCS

## 2025-05-12 PROCEDURE — 70551 MRI BRAIN STEM W/O DYE: CPT

## 2025-05-12 PROCEDURE — 93306 TTE W/DOPPLER COMPLETE: CPT

## 2025-05-12 PROCEDURE — 36415 COLL VENOUS BLD VENIPUNCTURE: CPT

## 2025-05-12 PROCEDURE — 80061 LIPID PANEL: CPT

## 2025-05-12 PROCEDURE — 6370000000 HC RX 637 (ALT 250 FOR IP): Performed by: STUDENT IN AN ORGANIZED HEALTH CARE EDUCATION/TRAINING PROGRAM

## 2025-05-12 PROCEDURE — 6370000000 HC RX 637 (ALT 250 FOR IP): Performed by: NURSE PRACTITIONER

## 2025-05-12 PROCEDURE — 6360000002 HC RX W HCPCS: Performed by: STUDENT IN AN ORGANIZED HEALTH CARE EDUCATION/TRAINING PROGRAM

## 2025-05-12 PROCEDURE — 97535 SELF CARE MNGMENT TRAINING: CPT

## 2025-05-12 PROCEDURE — 85027 COMPLETE CBC AUTOMATED: CPT

## 2025-05-12 PROCEDURE — 99222 1ST HOSP IP/OBS MODERATE 55: CPT | Performed by: NURSE PRACTITIONER

## 2025-05-12 PROCEDURE — 93010 ELECTROCARDIOGRAM REPORT: CPT | Performed by: INTERNAL MEDICINE

## 2025-05-12 PROCEDURE — 93306 TTE W/DOPPLER COMPLETE: CPT | Performed by: INTERNAL MEDICINE

## 2025-05-12 PROCEDURE — 2500000003 HC RX 250 WO HCPCS: Performed by: STUDENT IN AN ORGANIZED HEALTH CARE EDUCATION/TRAINING PROGRAM

## 2025-05-12 PROCEDURE — 97161 PT EVAL LOW COMPLEX 20 MIN: CPT

## 2025-05-12 PROCEDURE — 80048 BASIC METABOLIC PNL TOTAL CA: CPT

## 2025-05-12 PROCEDURE — 97165 OT EVAL LOW COMPLEX 30 MIN: CPT

## 2025-05-12 RX ORDER — CLOPIDOGREL BISULFATE 75 MG/1
75 TABLET ORAL DAILY
Status: DISCONTINUED | OUTPATIENT
Start: 2025-05-13 | End: 2025-05-12 | Stop reason: HOSPADM

## 2025-05-12 RX ORDER — AMLODIPINE AND VALSARTAN 10; 320 MG/1; MG/1
1 TABLET ORAL DAILY
COMMUNITY

## 2025-05-12 RX ORDER — CLOPIDOGREL BISULFATE 75 MG/1
300 TABLET ORAL ONCE
Status: COMPLETED | OUTPATIENT
Start: 2025-05-12 | End: 2025-05-12

## 2025-05-12 RX ORDER — CLOPIDOGREL BISULFATE 75 MG/1
75 TABLET ORAL DAILY
Qty: 21 TABLET | Refills: 0 | Status: SHIPPED | OUTPATIENT
Start: 2025-05-13 | End: 2025-06-03

## 2025-05-12 RX ADMIN — SODIUM CHLORIDE, PRESERVATIVE FREE 5 ML: 5 INJECTION INTRAVENOUS at 09:04

## 2025-05-12 RX ADMIN — VALSARTAN 40 MG: 40 TABLET, FILM COATED ORAL at 09:01

## 2025-05-12 RX ADMIN — AMLODIPINE BESYLATE 10 MG: 10 TABLET ORAL at 09:01

## 2025-05-12 RX ADMIN — ENOXAPARIN SODIUM 40 MG: 100 INJECTION SUBCUTANEOUS at 09:01

## 2025-05-12 RX ADMIN — CLOPIDOGREL BISULFATE 300 MG: 75 TABLET, FILM COATED ORAL at 12:10

## 2025-05-12 RX ADMIN — ASPIRIN 81 MG: 81 TABLET, CHEWABLE ORAL at 09:01

## 2025-05-12 NOTE — DISCHARGE SUMMARY
Hospitalist Discharge Summary   Admit Date:  2025  5:10 PM   DC Note date: 2025  Name:  Choco Sarmiento   Age:  74 y.o.  Sex:  male  :  1950   MRN:  [id number]   Room:  [facility]  PCP:  Angelo Patricia RN    Presenting Complaint: Possible TIA     Initial Admission Diagnosis: TIA (transient ischemic attack) [G45.9]  Cerebrovascular accident (CVA), unspecified mechanism (HCC) [I63.9]     Problem List for this Hospitalization (present on admission):    Principal Problem:    TIA (transient ischemic attack)  Active Problems:    Hypertension    Hyperlipidemia    Hyperglycemia    History of left knee replacement    Chronic low back pain  Resolved Problems:    * No resolved hospital problems. *      Hospital Course:  Choco Sarmiento is a 74 y.o. male with medical history of hypertension and hyperlipidemia who presented with an episode of expressive aphasia earlier today that lasted for about 5 minutes.  Patient is visiting from [place] and was walking around a local festival when he was trying to talk with his wife and noticed he had difficulty speaking.  Patient's wife and patient both state he did not have any focal weakness in his arms or legs, no facial droop.  Patient denies chest pain, shortness of breath, palpitations, nausea, abdominal pain, vision changes associated.  Of note patient has been had a dull frontal headache over the past 2 days which is unusual for him.  He admits to not taking his home medications over the past 2 days due to traveling.  He states his blood pressure has been running in the 140s and 150s systolic even on his home regimen of amlodipine, valsartan, hydrochlorothiazide.     On arrival to the ED CMP and CBC largely unremarkable.  Head CT showed no acute intercranial abnormality.  CTA head and neck showed no acute abnormality.  EKG showed sinus rhythm.  Brain MRI and echocardiogram ordered.  Statin and full-strength aspirin given.  His presenting symptoms had

## 2025-05-12 NOTE — CARE COORDINATION
Patient admitted with TIA after difficulty with word finding. Code Stroke was initiated and workup continues, CM consult received secondary to Code Stroke protocol. Chart reviewed and CM met with patient and spouse at bedside. Patient and spouse were in town for their grandchild's graduation from [employer], but reside in [place] and [place] throughout the year. Patient does have a current PCP and no concerns with obtaining medications. Patient anticipates discharging today and therapy recommending no further skilled needs at discharge. No other CM needs/concerns noted and no needs verbalized by patient/spouse.        05/12/25 3356   Service Assessment   Patient Orientation Alert and Oriented   Cognition Alert   History Provided By Medical Record   Primary Caregiver Self   Accompanied By/Relationship Spouse   Support Systems Spouse/Significant Other;Children;Family Members   Patient's Healthcare Decision Maker is: Legal Next of Kin   PCP Verified by CM Yes   Last Visit to PCP Within last 3 months   Prior Functional Level Independent in ADLs/IADLs   Current Functional Level Independent in ADLs/IADLs   Can patient return to prior living arrangement Yes   Ability to make needs known: Good   Family able to assist with home care needs: Yes   Would you like for me to discuss the discharge plan with any other family members/significant others, and if so, who? No   Financial Resources Medicare   Community Resources None   CM/SW Referral Other (see comment)  (Code Stroke Protocol)   Social/Functional History   Lives With Spouse   Type of Home Apartment   Occupation Retired   Discharge Planning   Type of Residence Apartment   Current Services Prior To Admission None   Potential Assistance Needed N/A   DME Ordered? No   Potential Assistance Purchasing Medications No   Type of Home Care Services None   Patient expects to be discharged to: Apartment   Services At/After Discharge   Transition of Care Consult (CM Consult)

## 2025-05-12 NOTE — CONSULTS
Consult    Patient: Choco Sarmiento MRN: [id number]     YOB: 1950  Age: 74 y.o.  Sex: male      Subjective:      Choco Sarmiento is a 74 y.o. male who is being seen for change in speech. The patient reports that yesterday he was talking to his wife and had difficulty getting words out. He said it sounded like mumbling. Reports symptoms lasted for about 3-5 minutes and then resolved. No recurrence since initial event. Denies any other symptoms with the episode. He has a history of HTN and HLD. He reports that he has been dealing with \"brain fog\" for several years that is felt to be related to long COVID. Reports no recent change in his symptoms related to this.     No past medical history on file.  No past surgical history on file.   No family history on file.  Social History     Tobacco Use    Smoking status: Never     Passive exposure: Never    Smokeless tobacco: Never   Substance Use Topics    Alcohol use: Not on file      Current Facility-Administered Medications   Medication Dose Route Frequency Provider Last Rate Last Admin    sodium chloride flush 0.9 % injection 5-40 mL  5-40 mL IntraVENous 2 times per day Jarod Musa, DO   5 mL at 05/12/25 0904    sodium chloride flush 0.9 % injection 5-40 mL  5-40 mL IntraVENous PRN Jarod Musa DO        0.9 % sodium chloride infusion   IntraVENous PRN Jarod Musa, DO        ondansetron (ZOFRAN-ODT) disintegrating tablet 4 mg  4 mg Oral Q8H PRN Jarod Musa DO        Or    ondansetron (ZOFRAN) injection 4 mg  4 mg IntraVENous Q6H PRN Jarod Musa, DO        polyethylene glycol (GLYCOLAX) packet 17 g  17 g Oral Daily PRN Jarod Musa DO        enoxaparin (LOVENOX) injection 40 mg  40 mg SubCUTAneous Daily Jarod Musa DO   40 mg at 05/12/25 0901    rosuvastatin (CRESTOR) tablet 40 mg  40 mg Oral Nightly Jarod Musa DO   40 mg at 05/11/25 2124    labetalol

## 2025-05-12 NOTE — THERAPY EVALUATION
ACUTE PHYSICAL THERAPY GOALS:   (Developed with and agreed upon by patient and/or caregiver.)  Eval & Discharge - No goals made; pt at baseline    PHYSICAL THERAPY Initial Assessment and Discharge  (Link to Caseload Tracking:    Acknowledge Orders  Time In/Out  PT Charge Capture  Rehab Caseload Tracker    Choco Sarmiento is a 74 y.o. male   PRIMARY DIAGNOSIS: TIA (transient ischemic attack)  TIA (transient ischemic attack) [G45.9]  Cerebrovascular accident (CVA), unspecified mechanism (HCC) [I63.9]       Reason for Referral: Difficulty in walking, Not elsewhere classified (R26.2)  Other abnormalities of gait and mobility (R26.89)  Observation: Payor: MEDICARE / Plan: MEDICARE PART A AND B / Product Type: *No Product type* /     ASSESSMENT:     REHAB RECOMMENDATIONS:   Recommendation to date pending progress:  Setting:  No further skilled physical therapy after discharge from hospital    Equipment:    None     ASSESSMENT:    Mr. Sarmiento is a 74 year old M who presents to hospital after an episode of difficulty walking and getting words out. PMH includes HTN and HLD. Pt reports he has been dealing with brain fog for many years, reportedly attributed to long COVID. He is active and independent at baseline; golfs a lot in his spare time and goes to the gym frequently. This date pt performs mobility including bed mobility, sitting balance activities, sit <> stand transfers, standing balance activities, and ambulation in room and hallway with no assistance or assistive device. Pt presents as functioning at his baseline, no acute PT intervention is indicated at this time. Will discharge from PT caseload. Please re-consult if any new needs arise. Thank you.       [facility] AM-PAC™ “6 Clicks” Basic Mobility Inpatient Short Form  AM-PAC Basic Mobility - Inpatient   How much help is needed turning from your back to your side while in a flat bed without using bedrails?: None  How much help is needed moving from

## 2025-05-12 NOTE — ED NOTES
TRANSFER - OUT REPORT:    Verbal report given to MIRIAN Robison on Choco Sarmiento  being transferred to 7th floor for routine progression of patient care       Report consisted of patient's Situation, Background, Assessment and   Recommendations(SBAR).     Information from the following report(s) Nurse Handoff Report, ED SBAR, MAR, Recent Results, and Neuro Assessment was reviewed with the receiving nurse.    Bryan Fall Assessment:    Presents to emergency department  because of falls (Syncope, seizure, or loss of consciousness): No  Age > 70: Yes  Altered Mental Status, Intoxication with alcohol or substance confusion (Disorientation, impaired judgment, poor safety awaremess, or inability to follow instructions): No  Impaired Mobility: Ambulates or transfers with assistive devices or assistance; Unable to ambulate or transer.: No  Nursing Judgement: Yes          Lines:   Peripheral IV 05/11/25 Left Antecubital (Active)   Site Assessment Clean, dry & intact 05/11/25 2039   Line Status Flushed 05/11/25 2039   Line Care Connections checked and tightened 05/11/25 2039   Phlebitis Assessment No symptoms 05/11/25 2039   Infiltration Assessment 0 05/11/25 2039   Alcohol Cap Used No 05/11/25 2039   Dressing Status Clean, dry & intact 05/11/25 2039   Dressing Type Transparent 05/11/25 2039        Opportunity for questions and clarification was provided.      Patient transported with:  Registered Nurse           Julia Branham RN  05/11/25 2040

## 2025-05-12 NOTE — PROGRESS NOTES
SPEECH LANGUAGE PATHOLOGY: DYSPHAGIA Initial Assessment    Acknowledge Order  I  Therapy Time  I   Charges     I  Rehab Caseload Tracker    NAME: Choco Sarmiento  : 1950  MRN: [id number]    ADMISSION DATE: 2025  PRIMARY DIAGNOSIS: TIA (transient ischemic attack)    ICD-10: Treatment Diagnosis: R13.12 Dysphagia, Oropharyngeal Phase    RECOMMENDATIONS   Diet:    Regular Consistency  Thin Liquids    Medication: as tolerated   Compensatory Swallowing Strategies:   Standard aspiration precautions    Therapeutic Intervention:   Pending results of CVA workup   Patient continues to require skilled intervention:  Yes. Recommend ongoing speech therapy services during this hospitalization.     Anticipated Discharge Needs: Do not anticipate ongoing speech therapy needs upon discharge.      ASSESSMENT    Patient presents with functional swallow as can be assessed at bedside. Adequate oral phase, no overt indications of airway compromise. Patient reports brief episode of confusion/aphasia yesterday though feels currently speech, swallow, and cognitive-linguistic status are at baseline.     Recommend regular/thin liquid, medications as tolerated. Speech therapy will follow up pending results of CVA workup.   GENERAL    Subjective: MIRIAN Ocampo cleared patient to participate with speech therapy. Patient upright in chair, pleasant and agreeable to speech therapy evaluation.     Reason for Consult:  CVA workup    History of Present Injury/Illness: Mr. Sarmiento  has no past medical history on file.. He also  has no past surgical history on file.  Precautions/Allergies: Patient has no known allergies.     Observations:  Alertness: Alert  Voice: WFL  Speech: Intelligible  Expressive Language: Fluent and Able to communicate wants and needs  Receptive Language: Answers yes/no questions, Follows basic commands, and Appropriately responds to questions  Cognition: Able to recall recent events and medical history and 
4 Eyes Skin Assessment     NAME:  Choco Sarmiento  YOB: 1950  MEDICAL RECORD NUMBER:  [id number]    The patient is being assessed for  Admission    I agree that at least one RN has performed a thorough Head to Toe Skin Assessment on the patient. ALL assessment sites listed below have been assessed.      Areas assessed by both nurses:    Sacrum. Buttock, Coccyx, Ischium        Does the Patient have a Wound? No noted wound(s)       Kendrick Prevention initiated by RN: Yes  Wound Care Orders initiated by RN: No    Pressure Injury (Stage 3,4, Unstageable, DTI, NWPT, and Complex wounds) if present, place Wound referral order by RN under : No    New Ostomies, if present place, Ostomy referral order under : No     Nurse 1 eSignature: Electronically signed by PIERCE VASQUEZ RN on 5/12/25 at 5:48 AM EDT    Nurse 2 eSignature: Electronically signed by CHLOE PELAYO RN on 5/12/25 at 5:50 AM EDT    
Outpatient Pharmacy Progress Note for Meds-to-Beds    Total number of Prescriptions Filled: 1    List of Medications (name,strength):    Clopidogrel 75mg    Delivered to:  Patient's room    Co-pay:  $1.95    Payment Type:  [place]     Additional Documentation:  Medication(s) were delivered to the patient's room prior to discharge      Thank you for letting us serve your patients.  [facility] [id number]- [place] [address]  Phone: [phone]  Fax: [phone]       
Stroke Education provided to patient and the following topics were discussed    1. Patients personal risk factors for stroke are carotid stenosis, diabetes mellitus, hyperlipidemia, and hypertension    2. Warning signs of Stroke:        * Sudden numbness or weakness of the face, arm or leg, especially on one side of          The body            * Sudden confusion, trouble speaking or understanding        * Sudden trouble seeing in one or both eyes        * Sudden trouble walking, dizziness, loss of balance or coordination        * Sudden severe headache with no known cause      3. Importance of activation Emergency Medical Services ( 9-1-1 ) immediately if experience any warning signs of stroke.    4. Be sure and schedule a follow-up appointment with your primary care doctor or any specialists as instructed.     5. You must take medicine every day to treat your risk factors for stroke.  Be sure to take your medicines exactly as your doctor tells you: no more, no less.  Know what your medicines are for , what they do.  Anti-thrombotics /anticoagulants can help prevent strokes.      6.  Smoking and second-hand smoke greatly increase your risk of stroke, cardiovascular disease and death. Smoking history never    7. Information provided was [facility] Stroke Education Binder, Stroke Handouts, or Verbal Education    8. Documentation of teaching completed in Patient Education Activity and on Care Plan with teaching response noted?  yes   
TRANSFER - IN REPORT:    Verbal report received from MIRIAN Dumont on Choco Sarmiento  being received from ED for routine progression of patient care      Report consisted of patient's Situation, Background, Assessment and   Recommendations(SBAR).     Information from the following report(s) Nurse Handoff Report, ED Encounter Summary, ED SBAR, Adult Overview, MAR, Recent Results, Quality Measures, and Neuro Assessment was reviewed with the receiving nurse.    Opportunity for questions and clarification was provided.      Assessment to be completed upon patient's arrival to unit and care assumed.     
recommendations, and warning signs and symptoms of stroke. Pt verbalized understanding.        TOTAL TREATMENT DURATION AND TIME:  Time In: 0904  Time Out: 0914  Minutes: 10    JO WHELAN, OT